# Patient Record
Sex: FEMALE | Race: WHITE | NOT HISPANIC OR LATINO | Employment: UNEMPLOYED | URBAN - METROPOLITAN AREA
[De-identification: names, ages, dates, MRNs, and addresses within clinical notes are randomized per-mention and may not be internally consistent; named-entity substitution may affect disease eponyms.]

---

## 2023-08-02 ENCOUNTER — HOSPITAL ENCOUNTER (EMERGENCY)
Facility: HOSPITAL | Age: 18
Discharge: HOME | End: 2023-08-03
Attending: EMERGENCY MEDICINE
Payer: COMMERCIAL

## 2023-08-02 ENCOUNTER — APPOINTMENT (OUTPATIENT)
Dept: CT IMAGING | Facility: HOSPITAL | Age: 18
End: 2023-08-02
Payer: COMMERCIAL

## 2023-08-02 DIAGNOSIS — L03.311 CELLULITIS OF ABDOMINAL WALL: Primary | ICD-10-CM

## 2023-08-02 LAB
ALBUMIN SERPL-MCNC: 4 G/DL (ref 3.5–5.2)
ALBUMIN/GLOB SERPL: 1 G/DL
ALP SERPL-CCNC: 189 U/L (ref 39–117)
ALT SERPL W P-5'-P-CCNC: 26 U/L (ref 1–33)
ANION GAP SERPL CALCULATED.3IONS-SCNC: 13 MMOL/L (ref 5–15)
AST SERPL-CCNC: 17 U/L (ref 1–32)
B-HCG UR QL: NEGATIVE
BACTERIA UR QL AUTO: ABNORMAL /HPF
BASOPHILS # BLD AUTO: 0.08 10*3/MM3 (ref 0–0.2)
BASOPHILS NFR BLD AUTO: 0.7 % (ref 0–1.5)
BILIRUB SERPL-MCNC: 0.2 MG/DL (ref 0–1.2)
BILIRUB UR QL STRIP: NEGATIVE
BUN SERPL-MCNC: 9 MG/DL (ref 6–20)
BUN/CREAT SERPL: 11.8 (ref 7–25)
CALCIUM SPEC-SCNC: 9.5 MG/DL (ref 8.6–10.5)
CHLORIDE SERPL-SCNC: 101 MMOL/L (ref 98–107)
CLARITY UR: ABNORMAL
CO2 SERPL-SCNC: 23 MMOL/L (ref 22–29)
COLOR UR: YELLOW
CREAT SERPL-MCNC: 0.76 MG/DL (ref 0.57–1)
DEPRECATED RDW RBC AUTO: 44.2 FL (ref 37–54)
EGFRCR SERPLBLD CKD-EPI 2021: 115.9 ML/MIN/1.73
EOSINOPHIL # BLD AUTO: 0.07 10*3/MM3 (ref 0–0.4)
EOSINOPHIL NFR BLD AUTO: 0.7 % (ref 0.3–6.2)
ERYTHROCYTE [DISTWIDTH] IN BLOOD BY AUTOMATED COUNT: 16.1 % (ref 12.3–15.4)
GLOBULIN UR ELPH-MCNC: 4 GM/DL
GLUCOSE SERPL-MCNC: 100 MG/DL (ref 65–99)
GLUCOSE UR STRIP-MCNC: NEGATIVE MG/DL
HCG INTACT+B SERPL-ACNC: 0.68 MIU/ML
HCT VFR BLD AUTO: 38.1 % (ref 34–46.6)
HGB BLD-MCNC: 11.5 G/DL (ref 12–15.9)
HGB UR QL STRIP.AUTO: NEGATIVE
HOLD SPECIMEN: NORMAL
HYALINE CASTS UR QL AUTO: ABNORMAL /LPF
IMM GRANULOCYTES # BLD AUTO: 0.05 10*3/MM3 (ref 0–0.05)
IMM GRANULOCYTES NFR BLD AUTO: 0.5 % (ref 0–0.5)
KETONES UR QL STRIP: NEGATIVE
LEUKOCYTE ESTERASE UR QL STRIP.AUTO: ABNORMAL
LIPASE SERPL-CCNC: 17 U/L (ref 13–60)
LYMPHOCYTES # BLD AUTO: 2.95 10*3/MM3 (ref 0.7–3.1)
LYMPHOCYTES NFR BLD AUTO: 27.5 % (ref 19.6–45.3)
MCH RBC QN AUTO: 22.8 PG (ref 26.6–33)
MCHC RBC AUTO-ENTMCNC: 30.2 G/DL (ref 31.5–35.7)
MCV RBC AUTO: 75.6 FL (ref 79–97)
MONOCYTES # BLD AUTO: 0.48 10*3/MM3 (ref 0.1–0.9)
MONOCYTES NFR BLD AUTO: 4.5 % (ref 5–12)
NEUTROPHILS NFR BLD AUTO: 66.1 % (ref 42.7–76)
NEUTROPHILS NFR BLD AUTO: 7.09 10*3/MM3 (ref 1.7–7)
NITRITE UR QL STRIP: NEGATIVE
NRBC BLD AUTO-RTO: 0 /100 WBC (ref 0–0.2)
PH UR STRIP.AUTO: 7 [PH] (ref 5–8)
PLATELET # BLD AUTO: 578 10*3/MM3 (ref 140–450)
PMV BLD AUTO: 9.9 FL (ref 6–12)
POTASSIUM SERPL-SCNC: 4.2 MMOL/L (ref 3.5–5.2)
PROT SERPL-MCNC: 8 G/DL (ref 6–8.5)
PROT UR QL STRIP: NEGATIVE
RBC # BLD AUTO: 5.04 10*6/MM3 (ref 3.77–5.28)
RBC # UR STRIP: ABNORMAL /HPF
REF LAB TEST METHOD: ABNORMAL
SODIUM SERPL-SCNC: 137 MMOL/L (ref 136–145)
SP GR UR STRIP: 1.01 (ref 1–1.03)
SQUAMOUS #/AREA URNS HPF: ABNORMAL /HPF
UROBILINOGEN UR QL STRIP: ABNORMAL
WBC # UR STRIP: ABNORMAL /HPF
WBC NRBC COR # BLD: 10.72 10*3/MM3 (ref 3.4–10.8)
WHOLE BLOOD HOLD COAG: NORMAL
WHOLE BLOOD HOLD SPECIMEN: NORMAL

## 2023-08-02 PROCEDURE — 81025 URINE PREGNANCY TEST: CPT | Performed by: EMERGENCY MEDICINE

## 2023-08-02 PROCEDURE — 84702 CHORIONIC GONADOTROPIN TEST: CPT | Performed by: EMERGENCY MEDICINE

## 2023-08-02 PROCEDURE — 25010000002 CEFTRIAXONE PER 250 MG: Performed by: PHYSICIAN ASSISTANT

## 2023-08-02 PROCEDURE — 80053 COMPREHEN METABOLIC PANEL: CPT | Performed by: PHYSICIAN ASSISTANT

## 2023-08-02 PROCEDURE — 25010000002 METHYLPREDNISOLONE PER 40 MG: Performed by: PHYSICIAN ASSISTANT

## 2023-08-02 PROCEDURE — 85025 COMPLETE CBC W/AUTO DIFF WBC: CPT | Performed by: PHYSICIAN ASSISTANT

## 2023-08-02 PROCEDURE — 99284 EMERGENCY DEPT VISIT MOD MDM: CPT

## 2023-08-02 PROCEDURE — 87205 SMEAR GRAM STAIN: CPT | Performed by: PHYSICIAN ASSISTANT

## 2023-08-02 PROCEDURE — 25010000002 DIPHENHYDRAMINE PER 50 MG: Performed by: PHYSICIAN ASSISTANT

## 2023-08-02 PROCEDURE — 83690 ASSAY OF LIPASE: CPT | Performed by: PHYSICIAN ASSISTANT

## 2023-08-02 PROCEDURE — 87070 CULTURE OTHR SPECIMN AEROBIC: CPT | Performed by: PHYSICIAN ASSISTANT

## 2023-08-02 PROCEDURE — 96375 TX/PRO/DX INJ NEW DRUG ADDON: CPT

## 2023-08-02 PROCEDURE — 96361 HYDRATE IV INFUSION ADD-ON: CPT

## 2023-08-02 PROCEDURE — 96365 THER/PROPH/DIAG IV INF INIT: CPT

## 2023-08-02 PROCEDURE — 74176 CT ABD & PELVIS W/O CONTRAST: CPT

## 2023-08-02 PROCEDURE — 81001 URINALYSIS AUTO W/SCOPE: CPT | Performed by: PHYSICIAN ASSISTANT

## 2023-08-02 RX ORDER — SODIUM CHLORIDE 0.9 % (FLUSH) 0.9 %
10 SYRINGE (ML) INJECTION AS NEEDED
Status: DISCONTINUED | OUTPATIENT
Start: 2023-08-02 | End: 2023-08-03 | Stop reason: HOSPADM

## 2023-08-02 RX ORDER — DIPHENHYDRAMINE HYDROCHLORIDE 50 MG/ML
50 INJECTION INTRAMUSCULAR; INTRAVENOUS
Status: COMPLETED | OUTPATIENT
Start: 2023-08-02 | End: 2023-08-02

## 2023-08-02 RX ORDER — SULFAMETHOXAZOLE AND TRIMETHOPRIM 800; 160 MG/1; MG/1
1 TABLET ORAL 2 TIMES DAILY
Qty: 14 TABLET | Refills: 0 | Status: SHIPPED | OUTPATIENT
Start: 2023-08-02 | End: 2023-08-09

## 2023-08-02 RX ORDER — CEPHALEXIN 500 MG/1
500 CAPSULE ORAL 3 TIMES DAILY
Qty: 21 CAPSULE | Refills: 0 | Status: SHIPPED | OUTPATIENT
Start: 2023-08-02 | End: 2023-08-09

## 2023-08-02 RX ORDER — METHYLPREDNISOLONE SODIUM SUCCINATE 40 MG/ML
40 INJECTION, POWDER, LYOPHILIZED, FOR SOLUTION INTRAMUSCULAR; INTRAVENOUS EVERY 4 HOURS
Status: DISCONTINUED | OUTPATIENT
Start: 2023-08-02 | End: 2023-08-03 | Stop reason: HOSPADM

## 2023-08-02 RX ADMIN — SODIUM CHLORIDE 2000 MG: 900 INJECTION INTRAVENOUS at 23:14

## 2023-08-02 RX ADMIN — DIPHENHYDRAMINE HYDROCHLORIDE 50 MG: 50 INJECTION INTRAMUSCULAR; INTRAVENOUS at 19:46

## 2023-08-02 RX ADMIN — SODIUM CHLORIDE 1000 ML: 9 INJECTION, SOLUTION INTRAVENOUS at 19:46

## 2023-08-02 RX ADMIN — METHYLPREDNISOLONE SODIUM SUCCINATE 40 MG: 40 INJECTION, POWDER, FOR SOLUTION INTRAMUSCULAR; INTRAVENOUS at 19:46

## 2023-08-02 NOTE — ED PROVIDER NOTES
"Subjective  History of Present Illness:    Chief Complaint:  wound infection  History of Present Illness: 17-year-old female presents with a wound infection at her  incision site, her  was 11 weeks ago, she has been seen at [facility] in the emergency department 3 times within the last several weeks for this wound infection.  She states each time she is evaluated, she was given pain medication and on the last visit she had an incision and drainage and a drain placed, she also reports she was given for Bactrim's on her last visit which was 3 to 4 days ago but no prescription to go home with.  She has been taking her 's Bactrim.  She also reports that she was seen by her OB/GYN since the symptoms began, on all episodes she was told that this was a wound infection,.  She reports no labs or CT scans have been performed on day of her ED visits.  She is complaining of pain, the drainage on the right side, however the pain is on both sides of the area of drainage.  Onset: Gradual  Duration: Several weeks  Exacerbating / Alleviating factors: Pain with movement and activity  Associated symptoms: Redness around the drainage site, recent incision and drainage.      Nurses Notes reviewed and agree, including vitals, allergies, social history and prior medical history.     REVIEW OF SYSTEMS: All systems reviewed and not pertinent unless noted.    Review of Systems   Gastrointestinal:         Incision site infection from    All other systems reviewed and are negative.    No past medical history on file.    Allergies:    Iodine and Latex      No past surgical history on file.      Social History     Socioeconomic History    Marital status:          No family history on file.    Objective  Physical Exam:  /78   Pulse 96   Temp 98.3 øF (36.8 øC) (Oral)   Resp 20   Ht 167.6 cm (66\")   Wt 104 kg (230 lb)   SpO2 96%   BMI 37.12 kg/mý      Physical Exam  Vitals and " nursing note reviewed.   Constitutional:       Appearance: She is well-developed.   HENT:      Head: Normocephalic and atraumatic.   Cardiovascular:      Rate and Rhythm: Normal rate and regular rhythm.   Pulmonary:      Effort: Pulmonary effort is normal.      Breath sounds: Normal breath sounds.   Abdominal:          Comments: Tenderness to palpation on the right side of the , there is area that has been recently drained with a drain in place.   Musculoskeletal:         General: Normal range of motion.      Cervical back: Normal range of motion and neck supple.   Skin:     General: Skin is warm and dry.   Neurological:      Mental Status: She is alert and oriented to person, place, and time.      Deep Tendon Reflexes: Reflexes are normal and symmetric.         Procedures    ED Course:    ED Course as of 08/02/23 2354   Wed Aug 02, 2023   2025 I updated the patient and/or family on any/all pending labs and/or lab results, any/all pending radiology and/or results and answered any/all questions.   [CS]   2121 Despite the premed medication for IV contrast allergy, patient is still nervous about contrast, will scan without [CS]   2229 Updated the patient on the CT scan findings, will place a consultation with OB/GYN  [CS]   223 Call placed to on call Ob/gyn Dr. church for the following results abnormal ct scan findings. I updated the patient.  [CS]   2253 Discussed the case with Dr. Church, there does not appear to be a mature abscess, he recommended covering with antibiotics, patient is welcome to call the clinic for follow-up [CS]   2348 I updated the patient and/or family on the discharge plan of care. I discussed any/all final labs, radiology and consultation plans ( if applicable) with the patient and /or family. I answered all questions, discussed follow up and signs or symptoms that warrant a return to the ED  [CS]      ED Course User Index  [CS] Moi Espino Jr., LEXA       Lab Results (last   hours)       Procedure Component Value Units Date/Time    CBC & Differential [[id number]]  (Abnormal) Collected: 08/02/23 1942    Specimen: Blood Updated: 08/02/23 1959    Narrative:      The following orders were created for panel order CBC & Differential.  Procedure                               Abnormality         Status                     ---------                               -----------         ------                     CBC Auto Differential[[id number]]        Abnormal            Final result                 Please view results for these tests on the individual orders.    Comprehensive Metabolic Panel [[id number]]  (Abnormal) Collected: 08/02/23 1942    Specimen: Blood Updated: 08/02/23 2013     Glucose 100 mg/dL      BUN 9 mg/dL      Creatinine 0.76 mg/dL      Sodium 137 mmol/L      Potassium 4.2 mmol/L      Comment: Slight hemolysis detected by analyzer. Results may be affected.        Chloride 101 mmol/L      CO2 23.0 mmol/L      Calcium 9.5 mg/dL      Total Protein 8.0 g/dL      Albumin 4.0 g/dL      ALT (SGPT) 26 U/L      AST (SGOT) 17 U/L      Alkaline Phosphatase 189 U/L      Total Bilirubin 0.2 mg/dL      Globulin 4.0 gm/dL      Comment: Calculated Result        A/G Ratio 1.0 g/dL      BUN/Creatinine Ratio 11.8     Anion Gap 13.0 mmol/L      eGFR 115.9 mL/min/1.73     Narrative:      GFR Normal >60  Chronic Kidney Disease <60  Kidney Failure <15      Lipase [[id number]]  (Normal) Collected: 08/02/23 1942    Specimen: Blood Updated: 08/02/23 2013     Lipase 17 U/L     CBC Auto Differential [[id number]]  (Abnormal) Collected: 08/02/23 1942    Specimen: Blood Updated: 08/02/23 1959     WBC 10.72 10*3/mm3      RBC 5.04 10*6/mm3      Hemoglobin 11.5 g/dL      Hematocrit 38.1 %      MCV 75.6 fL      MCH 22.8 pg      MCHC 30.2 g/dL      RDW 16.1 %      RDW-SD 44.2 fl      MPV 9.9 fL      Platelets 578 10*3/mm3      Neutrophil % 66.1 %      Lymphocyte % 27.5 %      Monocyte % 4.5 %      Eosinophil % 0.7 %       Basophil % 0.7 %      Immature Grans % 0.5 %      Neutrophils, Absolute 7.09 10*3/mm3      Lymphocytes, Absolute 2.95 10*3/mm3      Monocytes, Absolute 0.48 10*3/mm3      Eosinophils, Absolute 0.07 10*3/mm3      Basophils, Absolute 0.08 10*3/mm3      Immature Grans, Absolute 0.05 10*3/mm3      nRBC 0.0 /100 WBC     Wound Culture - Wound, Abdominal Wall [[id number]] Collected: 08/02/23 1942    Specimen: Wound from Abdominal Wall Updated: 08/02/23 2029     Gram Stain Few (2+) WBCs seen      No organisms seen    hCG, Quantitative, Pregnancy [[id number]] Collected: 08/02/23 1942    Specimen: Blood Updated: 08/02/23 2108     HCG Quantitative 0.68 mIU/mL     Narrative:      HCG Ranges by Gestational Age    Females - non-pregnant premenopausal   </= 1mIU/mL HCG  Females - postmenopausal               </= 7mIU/mL HCG    3 Weeks         5.8 -    71.2 mIU/mL  4 Weeks         9.5 -     750 mIU/mL  5 Weeks         217 -   7,138 mIU/mL  6 Weeks         158 -  31,795 mIU/mL  7 Weeks       3,697 - 163,563 mIU/mL  8 Weeks      32,065 - 149,571 mIU/mL  9 Weeks      63,803 - 151,410 mIU/mL  10 Weeks     46,509 - 186,977 mIU/mL  12 Weeks     27,832 - 210,612 mIU/mL  14 Weeks     13,950 -  62,530 mIU/mL  15 Weeks     12,039 -  70,971 mIU/mL  16 Weeks      9,040 -  56,451 mIU/mL  17 Weeks      8,175 -  55,868 mIU/mL  18 Weeks      8,099 -  58,176 mIU/mL  Results may be falsely decreased if patient taking Biotin.      Urinalysis With Culture If Indicated - Urine, Clean Catch [[id number]]  (Abnormal) Collected: 08/02/23 2046    Specimen: Urine, Clean Catch Updated: 08/02/23 2104     Color, UA Yellow     Appearance, UA Cloudy     pH, UA 7.0     Specific Gravity, UA 1.007     Glucose, UA Negative     Ketones, UA Negative     Bilirubin, UA Negative     Blood, UA Negative     Protein, UA Negative     Leuk Esterase, UA Trace     Nitrite, UA Negative     Urobilinogen, UA 0.2 E.U./dL    Narrative:      In absence of clinical symptoms, the presence  of pyuria, bacteria, and/or nitrites on the urinalysis result does not correlate with infection.    Urinalysis, Microscopic Only - Urine, Clean Catch [[id number]]  (Abnormal) Collected: 23    Specimen: Urine, Clean Catch Updated: 23     RBC, UA 0-2 /HPF      WBC, UA 3-5 /HPF      Comment: Urine culture not indicated.        Bacteria, UA 1+ /HPF      Squamous Epithelial Cells, UA 21-30 /HPF      Hyaline Casts, UA 0-6 /LPF      Methodology Automated Microscopy    Pregnancy, Urine - Urine, Clean Catch [[id number]]  (Normal) Collected: 23    Specimen: Urine, Clean Catch Updated: 23     HCG, Urine QL Negative             CT Abdomen Pelvis Without Contrast    Result Date: 2023  CT ABDOMEN PELVIS WO CONTRAST Date of Exam: 2023 9:24 PM EDT Indication: abdominal wall infection. Status post  11 weeks ago. Comparison: None available. Technique: Axial CT images were obtained of the abdomen and pelvis without the administration of contrast. Reconstructed coronal and sagittal images were also obtained. Automated exposure control and iterative construction methods were used. Findings: *Lower Thorax: Lung bases are clear. *Liver: Unremarkable. *Gallbladder: Normal gallbladder. *Pancreas: Normal. *Spleen: Normal. *Adrenal Glands: Normal. *Kidneys: No renal stones or hydronephrosis bilaterally. *Stomach: Gastric wall thickening. *Bowel: Nonobstructive bowel gas pattern. No bowel wall inflammation. *Appendix: Appendix is not visualized. No secondary signs of appendicitis. *Peritoneal Cavity: No pneumoperitoneum.  No lymphadenopathy. *Pelvis: No free pelvic fluid. *Bones: No acute fractures or dislocations. No osseous destructive lesions. *Abdominal wall/urinary bladder/pelvis: Abnormal soft tissue is identified involving lower abdominal wall rectus muscle (worse on left) and extending into the bladder dome and posterior anterior myometrium. In addition, soft tissue extends  anteriorly into subcutaneous soft tissues and abdominal wall skin. Circumferential mild urinary bladder wall thickening. *    Impression: *Abnormal soft tissue is identified involving lower abdominal wall rectus muscle (worse on left) and extending into the bladder dome and posterior anterior myometrium. In addition, soft tissue extends anteriorly into subcutaneous soft tissues and abdominal wall skin. No definite well-defined fluid collection is identified. Differential diagnoses include multilobulated phlegmon, metastatic deposits, inflammatory deposits, bladder dome mass, myometrial mass, or possibly endometriosis. Further work-up and follow-up recommended. *Urinary bladder wall thickening may represent cystitis or other etiologies. Please correlate with urinalysis. Follow-up recommended. Case discussed with clinician LINH GRIFFIN JR @ 2023 10:09 PM EDT. Electronically Signed: Duane Duarte  2023 10:11 PM EDT  Workstation ID: [id number]        Medical Decision Making  17-year-old female with pain in her incision site from a  11 weeks prior, she has been dealing with pain, redness and drainage since 4 days postop.  She reports multiple ED visits at an outside hospital, and follow-up with her OB/GYN.  On my initial assessment, physical exam findings showed some cellulitis around the incision site and a drain from her recent incision and drainage at an outside hospital.  Her vital signs are stable no acute distress nontoxic-appearing.  Differential include intra-abdominal abscess, seroma, fistula, cellulitis.  An IV established, labs were drawn, and a CT scan was performed.  I interpreted all labs and discussed with the patient family at the bedside, she had a 10.5 to white count, multiple epithelial cells on her urinalysis lidocaine collection, no abnormalities in her electrolytes.  The CT scan had multiple differentials including scar tissue, endometriosis, phlegmon and metastatic deposits.   Consultation placed with the on-call OB/GYN Dr. Marroquin, we had a discussion for plan of care with the patient, and agreed on a dose of Rocephin in the ED, no mature abscess seen, the patient is 11 weeks out, we will start her on antibiotics, and she is requesting follow-up with a local group, she was given the phone number for OB/GYN.  On reevaluation she remained stable, she did receive an IV dose of Rocephin and a liter of fluid while in the ED.  She will be discharged home on Bactrim and Keflex.    Problems Addressed:  Cellulitis of abdominal wall: complicated acute illness or injury    Amount and/or Complexity of Data Reviewed  Labs: ordered. Decision-making details documented in ED Course.  Radiology: ordered. Decision-making details documented in ED Course.    Risk  Prescription drug management.          Final diagnoses:   Cellulitis of abdominal wall          Moi Espino Jr., PA-C  08/02/23 3160

## 2023-08-03 VITALS
TEMPERATURE: 98.3 F | WEIGHT: 230 LBS | RESPIRATION RATE: 20 BRPM | HEIGHT: 66 IN | BODY MASS INDEX: 36.96 KG/M2 | SYSTOLIC BLOOD PRESSURE: 131 MMHG | HEART RATE: 92 BPM | DIASTOLIC BLOOD PRESSURE: 64 MMHG | OXYGEN SATURATION: 97 %

## 2023-08-05 LAB
BACTERIA SPEC AEROBE CULT: NORMAL
GRAM STN SPEC: NORMAL
GRAM STN SPEC: NORMAL

## 2023-08-16 ENCOUNTER — HOSPITAL ENCOUNTER (EMERGENCY)
Facility: HOSPITAL | Age: 18
Discharge: HOME | End: 2023-08-16
Payer: COMMERCIAL

## 2023-08-16 VITALS
HEART RATE: 113 BPM | WEIGHT: 240 LBS | BODY MASS INDEX: 38.57 KG/M2 | HEIGHT: 66 IN | TEMPERATURE: 98.8 F | OXYGEN SATURATION: 97 % | RESPIRATION RATE: 18 BRPM | SYSTOLIC BLOOD PRESSURE: 114 MMHG | DIASTOLIC BLOOD PRESSURE: 83 MMHG

## 2023-08-16 PROCEDURE — 99211 OFF/OP EST MAY X REQ PHY/QHP: CPT

## 2024-09-19 ENCOUNTER — NURSE TRIAGE (OUTPATIENT)
Dept: CALL CENTER | Facility: HOSPITAL | Age: 19
End: 2024-09-19
Payer: COMMERCIAL

## 2024-10-22 ENCOUNTER — TELEPHONE (OUTPATIENT)
Dept: CARDIOLOGY | Facility: CLINIC | Age: 19
End: 2024-10-22
Payer: COMMERCIAL

## 2024-10-22 ENCOUNTER — OFFICE VISIT (OUTPATIENT)
Dept: CARDIOLOGY | Facility: CLINIC | Age: 19
End: 2024-10-22
Payer: COMMERCIAL

## 2024-10-22 VITALS
SYSTOLIC BLOOD PRESSURE: 118 MMHG | HEART RATE: 113 BPM | BODY MASS INDEX: 44.03 KG/M2 | HEIGHT: 66 IN | WEIGHT: 274 LBS | DIASTOLIC BLOOD PRESSURE: 93 MMHG

## 2024-10-22 DIAGNOSIS — R00.2 PALPITATIONS: Primary | ICD-10-CM

## 2024-10-22 DIAGNOSIS — R00.0 SINUS TACHYCARDIA: ICD-10-CM

## 2024-10-22 DIAGNOSIS — R42 DIZZINESS: ICD-10-CM

## 2024-10-22 DIAGNOSIS — E66.01 MORBID (SEVERE) OBESITY DUE TO EXCESS CALORIES: ICD-10-CM

## 2024-10-22 PROCEDURE — 1159F MED LIST DOCD IN RCRD: CPT | Performed by: INTERNAL MEDICINE

## 2024-10-22 PROCEDURE — 1160F RVW MEDS BY RX/DR IN RCRD: CPT | Performed by: INTERNAL MEDICINE

## 2024-10-22 PROCEDURE — 99204 OFFICE O/P NEW MOD 45 MIN: CPT | Performed by: INTERNAL MEDICINE

## 2024-10-22 RX ORDER — DEXTROAMPHETAMINE SULFATE, DEXTROAMPHETAMINE SACCHARATE, AMPHETAMINE SULFATE AND AMPHETAMINE ASPARTATE 6.25; 6.25; 6.25; 6.25 MG/1; MG/1; MG/1; MG/1
1 CAPSULE, EXTENDED RELEASE ORAL DAILY
COMMUNITY

## 2024-10-22 RX ORDER — CLINDAMYCIN HCL 150 MG
150 CAPSULE ORAL 4 TIMES DAILY
COMMUNITY
Start: 2024-10-17

## 2024-10-22 RX ORDER — LAMOTRIGINE 25 MG/1
25 TABLET ORAL 2 TIMES DAILY
COMMUNITY
Start: 2024-07-23

## 2024-10-22 RX ORDER — ARIPIPRAZOLE 5 MG/1
5 TABLET ORAL DAILY
COMMUNITY

## 2024-10-22 NOTE — TELEPHONE ENCOUNTER
Per checkout note from Dr. Tipton:    Send clearance today to Dr Mackey with [facility], low risk, stable for [place] teeth removal      Refaxed to [phone]

## 2024-10-22 NOTE — PROGRESS NOTES
Chief Complaint  Rapid Heart Rate, new patient (Reports afib on watch, getting wisdom taken out Friday needs clearance for that), Shortness of Breath, Dizziness, and Loss of Consciousness (2 times in the last month)    Subjective            Brienna Beth Ruiz presents to [facility] CARDIOLOGY  Shortness of Breath  Associated symptoms include syncope. Pertinent negatives include no chest pain.   Dizziness  Pertinent negatives include no chest pain.       19-year-old female.  She has no significant cardiac history.  She was referred due to subjective tachycardia.  She reports having a rapid heart rate since about age 15.  Symptoms occur daily.  Her Apple Watch records her heart rates in the 130s to 140s.  Her average heart rates she reports is 1 tended 120 at baseline.  She has had intermittent dizziness, syncope a couple times over the past month.  She just turned in a Holter monitor yesterday.  She had an EKG in July that was normal.  She reports not drinking enough fluids.  She is morbidly obese.    PMH  Past Medical History:   Diagnosis Date    Heart murmur          SURGICALHX  History reviewed. No pertinent surgical history.     SOC  Social History     Socioeconomic History    Marital status:    Tobacco Use    Smoking status: Former     Types: Cigarettes    Smokeless tobacco: Never   Vaping Use    Vaping status: Every Day    Substances: Nicotine   Substance and Sexual Activity    Alcohol use: Yes     Comment: social    Drug use: Not Currently     Types: Hydrocodone    Sexual activity: Defer         FAMHX  History reviewed. No pertinent family history.       ALLERGY  Allergies   Allergen Reactions    Iodine Anaphylaxis    Penicillins Anaphylaxis    Shellfish-Derived Products Anaphylaxis    Latex Itching        MEDSCURRENT    Current Outpatient Medications:     Adderall XR 25 MG 24 hr capsule, Take 1 capsule by mouth Daily, Disp: , Rfl:     ARIPiprazole (ABILIFY) 5 MG tablet, Take 1  "tablet by mouth Daily., Disp: , Rfl:     clindamycin (CLEOCIN) 150 MG capsule, Take 1 capsule by mouth 4 (Four) Times a Day., Disp: , Rfl:     lamoTRIgine (LaMICtal) 25 MG tablet, Take 1 tablet by mouth 2 (Two) Times a Day., Disp: , Rfl:       Review of Systems   Constitutional: Negative.   HENT: Negative.     Eyes: Negative.    Cardiovascular:  Positive for irregular heartbeat, near-syncope, palpitations and syncope. Negative for chest pain.   Respiratory:  Positive for shortness of breath.    Endocrine: Negative.    Hematologic/Lymphatic: Negative.    Skin: Negative.    Musculoskeletal: Negative.    Gastrointestinal: Negative.    Genitourinary: Negative.    Neurological:  Positive for dizziness and light-headedness.   Psychiatric/Behavioral: Negative.          Objective     /93   Pulse 113   Ht 167.6 cm (66\")   Wt 124 kg (274 lb)   BMI 44.22 kg/m²       General Appearance:   well developed  well nourished, morbidly obese  HENT:   oropharynx moist  lips not cyanotic  Neck:  thyroid not enlarged  supple  Respiratory:  no respiratory distress  normal breath sounds  no rales  Cardiovascular:  no jugular venous distention  regular rhythm, tachycardic rate  apical impulse normal  S1 normal, S2 normal  no S3, no S4   no murmur  no rub, no thrill  carotid pulses normal; no bruit  pedal pulses normal  lower extremity edema: none    Musculoskeletal:  no clubbing of fingers.   normocephalic, head atraumatic  Skin:   warm, dry  Psychiatric:  judgement and insight appropriate  normal mood and affect      Result Review :             Data reviewed : Primary care records reviewed, laboratory studies reviewed, recent CBC normal, CMP normal.  EKG in July showed sinus rhythm rate 97, normal no previous EKGs for review       Procedures                Assessment and Plan        ASSESSMENT:  Encounter Diagnoses   Name Primary?    Palpitations Yes    Sinus tachycardia     Morbid (severe) obesity due to excess calories     " Dizziness          PLAN:    1.  Palpitations, intermittent dizziness.  Likely sinus tachycardia by the patient's own monitoring.  Holter monitor is pending.  She may have some degree of autonomic dysfunction given the described symptoms.  I recommend aggressive hydration, limiting caffeine.  Will review Holter when it is available since it was just turned in yesterday.  An echo will be done to rule out structural abnormalities.  Will consider using a beta-blocker depending on the results of the diagnostics.  2.  Morbid obesity due to excess calories-weight loss counseling  3.  We will discuss diagnostic results when available          Patient was given instructions and counseling regarding her condition or for health maintenance advice. Please see specific information pulled into the AVS if appropriate.             PEARL Tipton MD  10/22/2024    13:42 EDT

## 2024-10-31 ENCOUNTER — TELEMEDICINE (OUTPATIENT)
Dept: FAMILY MEDICINE CLINIC | Facility: TELEHEALTH | Age: 19
End: 2024-10-31
Payer: COMMERCIAL

## 2024-10-31 DIAGNOSIS — B34.9 VIRAL ILLNESS: Primary | ICD-10-CM

## 2024-10-31 DIAGNOSIS — R21 RASH: ICD-10-CM

## 2024-10-31 DIAGNOSIS — N76.0 ACUTE VAGINITIS: ICD-10-CM

## 2024-10-31 RX ORDER — FLUCONAZOLE 150 MG/1
150 TABLET ORAL ONCE
Qty: 2 TABLET | Refills: 0 | Status: SHIPPED | OUTPATIENT
Start: 2024-10-31 | End: 2024-10-31

## 2024-10-31 RX ORDER — NYSTATIN AND TRIAMCINOLONE ACETONIDE 100000; 1 [USP'U]/G; MG/G
1 OINTMENT TOPICAL 2 TIMES DAILY
Qty: 30 G | Refills: 0 | Status: SHIPPED | OUTPATIENT
Start: 2024-10-31 | End: 2024-11-02

## 2024-11-01 NOTE — PROGRESS NOTES
Subjective   Giuliana Ruiz is a 19 y.o. female.     History of Present Illness  She has a rash under her breasts and on her stomach which started today. Denies sick contacts. Treatments tried include mupirocin.    She is having white thick vaginal discharge, vaginal itching, burning x 3 days.  She has done monistat 1 day ovule and itch relief cream.    She just finished clindamycin and prednisone.            The following portions of the patient's history were reviewed and updated as appropriate: allergies, current medications, past family history, past medical history, past social history, past surgical history, and problem list.    Review of Systems   Constitutional:  Positive for fever (101 this morning).   HENT:  Negative for congestion.    Respiratory:  Negative for cough.    Gastrointestinal:  Positive for diarrhea (x 2 days) and nausea (x2 days). Negative for vomiting.   Musculoskeletal:  Positive for myalgias (calf pain).   Neurological:  Positive for headache.       Objective   Physical Exam  Constitutional:       General: She is not in acute distress.     Appearance: She is well-developed. She is not diaphoretic.   Pulmonary:      Effort: Pulmonary effort is normal.   Skin:     Findings: Rash present.      Comments: See patient submitted photos   Neurological:      Mental Status: She is alert and oriented to person, place, and time.   Psychiatric:         Behavior: Behavior normal.           Assessment & Plan   Diagnoses and all orders for this visit:    1. Viral illness (Primary)    2. Acute vaginitis  -     fluconazole (Diflucan) 150 MG tablet; Take 1 tablet by mouth 1 (One) Time for 1 dose. Can repeat in 72 hours if symptoms persist  Dispense: 2 tablet; Refill: 0    3. Rash  -     nystatin-triamcinolone (MYCOLOG) 682823-7.1 UNIT/GM-% ointment; Apply 1 Application topically to the appropriate area as directed 2 (Two) Times a Day.  Dispense: 30 g; Refill: 0          Follow up in person if  symptoms worsen or do not improve       The use of a video visit has been reviewed with the patient and verbal informed consent has been obtained. Myself and Giuliana Ruiz participated in this visit. The patient is located in  [place], [place] . I am located in [place], [place]. [employer] and [employer] were utilized. I spent 20 minutes in the patient's chart for this visit.

## 2024-11-02 ENCOUNTER — TELEMEDICINE (OUTPATIENT)
Dept: FAMILY MEDICINE CLINIC | Facility: TELEHEALTH | Age: 19
End: 2024-11-02
Payer: COMMERCIAL

## 2024-11-02 DIAGNOSIS — B34.9 VIRAL ILLNESS: Primary | ICD-10-CM

## 2024-11-02 RX ORDER — BROMPHENIRAMINE MALEATE, PSEUDOEPHEDRINE HYDROCHLORIDE, AND DEXTROMETHORPHAN HYDROBROMIDE 2; 30; 10 MG/5ML; MG/5ML; MG/5ML
5 SYRUP ORAL 4 TIMES DAILY PRN
Qty: 118 ML | Refills: 0 | Status: SHIPPED | OUTPATIENT
Start: 2024-11-02 | End: 2024-11-12

## 2024-11-02 RX ORDER — LISDEXAMFETAMINE DIMESYLATE 30 MG/1
1 CAPSULE ORAL DAILY
COMMUNITY

## 2024-11-02 RX ORDER — ONDANSETRON 8 MG/1
TABLET, FILM COATED ORAL
COMMUNITY

## 2024-11-02 NOTE — PROGRESS NOTES
Chief Complaint   Patient presents with    Cough       Video Visit Reason:   Free Text Description: Cough, congestion, feeling very tired and rundown and slight bodyaches  Subjective   Briencamilla Ruiz is a 19 y.o. female.     History of Present Illness  Cough, congestion, fatigue and some body aches for the last 6 days. She had a fever last night up to 102 but it has been down today to 100. She has sore throat but it feels scratchy. She had negative Covid and Flu test recently at Urgent Care and was on Clindamycin and prednisone that was finished just a few days ago.   Cough  This is a recurrent problem. The current episode started 1 to 4 weeks ago. The cough is Dry. Associated symptoms include a fever, nasal congestion, postnasal drip and a sore throat. Pertinent negatives include no shortness of breath or wheezing. She has tried oral steroids and OTC cough suppressant for the symptoms.       The following portions of the patient's history were reviewed and updated as appropriate: allergies, current medications, past medical history, and problem list.      Past Medical History:   Diagnosis Date    Heart murmur      Social History     Socioeconomic History    Marital status:    Tobacco Use    Smoking status: Former     Types: Cigarettes    Smokeless tobacco: Never   Vaping Use    Vaping status: Every Day    Substances: Nicotine   Substance and Sexual Activity    Alcohol use: Yes     Comment: social    Drug use: Not Currently     Types: Hydrocodone    Sexual activity: Defer     medication documentation: reviewed and updated with patient and   Current Outpatient Medications:     Adderall XR 25 MG 24 hr capsule, Take 1 capsule by mouth Daily, Disp: , Rfl:     ARIPiprazole (ABILIFY) 5 MG tablet, Take 1 tablet by mouth Daily., Disp: , Rfl:     brompheniramine-pseudoephedrine-DM 30-2-10 MG/5ML syrup, Take 5 mL by mouth 4 (Four) Times a Day As Needed for Congestion or Cough for up to 10 days., Disp: 118  mL, Rfl: 0    lamoTRIgine (LaMICtal) 25 MG tablet, Take 1 tablet by mouth 2 (Two) Times a Day., Disp: , Rfl:     lisdexamfetamine (Vyvanse) 30 MG capsule, Take 1 capsule by mouth Daily, Disp: , Rfl:     ondansetron (ZOFRAN) 8 MG tablet, , Disp: , Rfl:   Review of Systems   Constitutional:  Positive for fatigue and fever.   HENT:  Positive for congestion, postnasal drip, sinus pressure and sore throat.    Respiratory:  Positive for cough and chest tightness. Negative for shortness of breath and wheezing.        Objective   Physical Exam  Constitutional:       General: She is not in acute distress.  HENT:      Nose: Congestion present.   Pulmonary:      Effort: Pulmonary effort is normal.      Comments: Cough and hoarseness  Neurological:      Mental Status: She is alert.   Psychiatric:         Mood and Affect: Mood normal.       Assessment & Plan   Diagnoses and all orders for this visit:    1. Viral illness (Primary)  -     brompheniramine-pseudoephedrine-DM 30-2-10 MG/5ML syrup; Take 5 mL by mouth 4 (Four) Times a Day As Needed for Congestion or Cough for up to 10 days.  Dispense: 118 mL; Refill: 0       Follow up at Urgent Care or PCP for reevaluation of lungs if no improvement in a few days.             Follow Up:  If your symptoms are not resolving by the completion of your treatment or are worsening, see your primary care provider for follow up. If you don't have a primary care provider, you may go to any Urgent Care for re-evaluation. If you develop any life threatening symptoms, go to the nearest Emergency Department immediately or call EMS.               The use of  Video Visit was utilized during this visit, using both [employer] and [employer]. The use of a video visit has been reviewed with the patient and verbal informed consent has been obtained. No technical difficulties occurred during the visit.    is located at [address] [facility] [place] [address]  Provider is located at [facility]  [place]

## 2024-11-02 NOTE — PATIENT INSTRUCTIONS
Upper Respiratory Infection, Adult  An upper respiratory infection (URI) is a common viral infection of the nose, throat, and upper air passages that lead to the lungs. The most common type of URI is the common cold. URIs usually get better on their own, without medical treatment.  What are the causes?  A URI is caused by a virus. You may catch a virus by:  Breathing in droplets from an infected person's cough or sneeze.  Touching something that has been exposed to the virus (is contaminated) and then touching your mouth, nose, or eyes.  What increases the risk?  You are more likely to get a URI if:  You are very young or very old.  You have close contact with others, such as at work, school, or a health care facility.  You smoke.  You have long-term (chronic) heart or lung disease.  You have a weakened disease-fighting system (immune system).  You have nasal allergies or asthma.  You are experiencing a lot of stress.  You have poor nutrition.  What are the signs or symptoms?  A URI usually involves some of the following symptoms:  Runny or stuffy (congested) nose.  Cough.  Sneezing.  Sore throat.  Headache.  Fatigue.  Fever.  Loss of appetite.  Pain in your forehead, behind your eyes, and over your cheekbones (sinus pain).  Muscle aches.  Redness or irritation of the eyes.  Pressure in the ears or face.  How is this diagnosed?  This condition may be diagnosed based on your medical history and symptoms, and a physical exam. Your health care provider may use a swab to take a mucus sample from your nose (nasal swab). This sample can be tested to determine what virus is causing the illness.  How is this treated?  URIs usually get better on their own within 7-10 days. Medicines cannot cure URIs, but your health care provider may recommend certain medicines to help relieve symptoms, such as:  Over-the-counter cold medicines.  Cough suppressants. Coughing is a type of defense against infection that helps to clear the  respiratory system, so take these medicines only as recommended by your health care provider.  Fever-reducing medicines.  Follow these instructions at home:  Activity  Rest as needed.  If you have a fever, stay home from work or school until your fever is gone or until your health care provider says your URI cannot spread to other people (is no longer contagious). Your health care provider may have you wear a face mask to prevent your infection from spreading.  Relieving symptoms  Gargle with a mixture of salt and water 3-4 times a day or as needed. To make salt water, completely dissolve ½-1 tsp (3-6 g) of salt in 1 cup (237 mL) of warm water.  Use a cool-mist humidifier to add moisture to the air. This can help you breathe more easily.  Eating and drinking    Drink enough fluid to keep your urine pale yellow.  Eat soups and other clear broths.  General instructions    Take over-the-counter and prescription medicines only as told by your health care provider. These include cold medicines, fever reducers, and cough suppressants.  Do not use any products that contain nicotine or tobacco. These products include cigarettes, chewing tobacco, and vaping devices, such as e-cigarettes. If you need help quitting, ask your health care provider.  Stay away from secondhand smoke.  Stay up to date on all immunizations, including the yearly (annual) flu vaccine.  Keep all follow-up visits. This is important.  How to prevent the spread of infection to others  URIs can be contagious. To prevent the infection from spreading:  Wash your hands with soap and water for at least 20 seconds. If soap and water are not available, use hand .  Avoid touching your mouth, face, eyes, or nose.  Cough or sneeze into a tissue or your sleeve or elbow instead of into your hand or into the air.    Contact a health care provider if:  You are getting worse instead of better.  You have a fever or chills.  Your mucus is brown or red.  You have  yellow or brown discharge coming from your nose.  You have pain in your face, especially when you bend forward.  You have swollen neck glands.  You have pain while swallowing.  You have white areas in the back of your throat.  Get help right away if:  You have shortness of breath that gets worse.  You have severe or persistent:  Headache.  Ear pain.  Sinus pain.  Chest pain.  You have chronic lung disease along with any of the following:  Making high-pitched whistling sounds when you breathe, most often when you breathe out (wheezing).  Prolonged cough (more than 14 days).  Coughing up blood.  A change in your usual mucus.  You have a stiff neck.  You have changes in your:  Vision.  Hearing.  Thinking.  Mood.  These symptoms may be an emergency. Get help right away. Call 911.  Do not wait to see if the symptoms will go away.  Do not drive yourself to the hospital.  Summary  An upper respiratory infection (URI) is a common infection of the nose, throat, and upper air passages that lead to the lungs.  A URI is caused by a virus.  URIs usually get better on their own within 7-10 days.  Medicines cannot cure URIs, but your health care provider may recommend certain medicines to help relieve symptoms.  This information is not intended to replace advice given to you by your health care provider. Make sure you discuss any questions you have with your health care provider.  Document Revised: 07/20/2022 Document Reviewed: 07/20/2022  [employer] Patient Education © 2024 [employer]

## 2024-11-18 ENCOUNTER — TELEPHONE (OUTPATIENT)
Dept: CARDIOLOGY | Facility: CLINIC | Age: 19
End: 2024-11-18
Payer: COMMERCIAL

## 2024-11-18 NOTE — TELEPHONE ENCOUNTER
----- Message from PEARL Tipton sent at 11/7/2024  8:54 AM EST -----        ----- Message -----  From: Dori Canales RegSched Rep  Sent: 11/7/2024   8:39 AM EST  To: PEARL Tipton MD    For review-thank you

## 2024-12-05 ENCOUNTER — TELEPHONE (OUTPATIENT)
Dept: CARDIOLOGY | Facility: CLINIC | Age: 19
End: 2024-12-05
Payer: COMMERCIAL

## 2024-12-05 NOTE — TELEPHONE ENCOUNTER
Spoke to pt and gave Holter Monitor results. Pt states that she is still concerned with her heart rate. She states that when she is taking a shower her heart rate goes to 195-205. Pt states that she cannot catch her breath and her chest gets tight. She would like to know if she can do another Holter monitor for a longer period of time. Pt states that she not having chest pain.

## 2024-12-05 NOTE — TELEPHONE ENCOUNTER
T   ----- Message from PEARL Tipton sent at 11/7/2024  8:54 AM EST -----      The Holter monitor showed normal rhythm, average heart rate was slightly rapid at 104.     The patient's symptoms correlated with just an elevated heart rate but no arrhythmias were observed     Based on this I would not recommend medication for the heart rate       ----- Message -----  From: Dori Canales RegSched Rep  Sent: 11/7/2024   8:39 AM EST  To: PEARL Tipton MD    For review-thank you

## 2024-12-06 DIAGNOSIS — R00.2 PALPITATIONS: Primary | ICD-10-CM

## 2024-12-06 NOTE — TELEPHONE ENCOUNTER
Sure that is ok, I ordered a 15-day monitor which she can come into our office and just have put on.    Of note she had 10 indications of symptoms while wearing the previous 4-day Holter and these correlated with normal rhythm.    We will call back with the results

## 2025-03-26 ENCOUNTER — TELEPHONE (OUTPATIENT)
Dept: CARDIOLOGY | Facility: CLINIC | Age: 20
End: 2025-03-26

## 2025-03-26 NOTE — TELEPHONE ENCOUNTER
Caller: SALOMON    Relationship to patient: Other    Best call back number: CALL PT [phone]    Chief complaint: TACHYCARDIA    Type of visit: FU    [facility]     Additional notes:PLEASE ADVISE WHEN TO SCHEDULE PT AND CALL PT BACK TO OFFER AN APPT.

## 2025-04-30 ENCOUNTER — TELEPHONE (OUTPATIENT)
Dept: GENETICS | Facility: HOSPITAL | Age: 20
End: 2025-04-30
Payer: COMMERCIAL

## 2025-04-30 NOTE — TELEPHONE ENCOUNTER
Called the patient regarding the fax-in genetic counseling referral from Soha Holden's office . Left a message on the voicemail.